# Patient Record
Sex: MALE | Race: WHITE | NOT HISPANIC OR LATINO | Employment: OTHER | URBAN - METROPOLITAN AREA
[De-identification: names, ages, dates, MRNs, and addresses within clinical notes are randomized per-mention and may not be internally consistent; named-entity substitution may affect disease eponyms.]

---

## 2021-02-25 ENCOUNTER — TRANSFERRED RECORDS (OUTPATIENT)
Dept: HEALTH INFORMATION MANAGEMENT | Facility: CLINIC | Age: 81
End: 2021-02-25

## 2021-04-08 ENCOUNTER — TRANSFERRED RECORDS (OUTPATIENT)
Dept: HEALTH INFORMATION MANAGEMENT | Facility: CLINIC | Age: 81
End: 2021-04-08

## 2021-06-08 ENCOUNTER — TRANSFERRED RECORDS (OUTPATIENT)
Dept: HEALTH INFORMATION MANAGEMENT | Facility: CLINIC | Age: 81
End: 2021-06-08

## 2021-06-10 ENCOUNTER — MEDICAL CORRESPONDENCE (OUTPATIENT)
Dept: HEALTH INFORMATION MANAGEMENT | Facility: CLINIC | Age: 81
End: 2021-06-10

## 2021-06-10 ENCOUNTER — TRANSFERRED RECORDS (OUTPATIENT)
Dept: HEALTH INFORMATION MANAGEMENT | Facility: CLINIC | Age: 81
End: 2021-06-10

## 2021-06-15 ENCOUNTER — MEDICAL CORRESPONDENCE (OUTPATIENT)
Dept: HEALTH INFORMATION MANAGEMENT | Facility: CLINIC | Age: 81
End: 2021-06-15

## 2021-06-15 ENCOUNTER — TRANSCRIBE ORDERS (OUTPATIENT)
Dept: OTHER | Age: 81
End: 2021-06-15

## 2021-06-15 DIAGNOSIS — C7A.090 PRIMARY ATYPICAL CARCINOID TUMOR OF LEFT LUNG (H): Primary | ICD-10-CM

## 2021-06-16 ENCOUNTER — MEDICAL CORRESPONDENCE (OUTPATIENT)
Dept: HEALTH INFORMATION MANAGEMENT | Facility: CLINIC | Age: 81
End: 2021-06-16

## 2021-06-17 NOTE — TELEPHONE ENCOUNTER
RECORDS STATUS - ALL OTHER DIAGNOSIS      RECORDS RECEIVED FROM: [facility]   DATE RECEIVED: 6/17/21   NOTES STATUS DETAILS   OFFICE NOTE from referring provider Received & Sent to [facility] 6/17 Dr. Shoaib Martin: 6/12/21 - 12/28/20   OFFICE NOTE from medical oncologist     DISCHARGE SUMMARY from hospital     DISCHARGE REPORT from the ER     PFT Received & Sent to [facility] 6/17 6/8/21   OPERATIVE REPORT     MEDICATION LIST     CLINICAL TRIAL TREATMENTS TO DATE     LABS     PATHOLOGY REPORTS     ANYTHING RELATED TO DIAGNOSIS Received & Sent to [facility] 6/17    GENONOMIC TESTING     TYPE:     IMAGING (NEED IMAGES & REPORT)     CT SCANS PACS 2/25/21, 9/28/20: [place]   MRI     MAMMO     ULTRASOUND     PET PACS 4/8/21, 8/17/20: [place]

## 2021-06-23 ENCOUNTER — PRE VISIT (OUTPATIENT)
Dept: SURGERY | Facility: CLINIC | Age: 81
End: 2021-06-23

## 2021-06-23 ENCOUNTER — VIRTUAL VISIT (OUTPATIENT)
Dept: SURGERY | Facility: CLINIC | Age: 81
End: 2021-06-23
Attending: THORACIC SURGERY (CARDIOTHORACIC VASCULAR SURGERY)
Payer: COMMERCIAL

## 2021-06-23 DIAGNOSIS — C7A.090 ATYPICAL CARCINOID LUNG TUMOR (H): Primary | ICD-10-CM

## 2021-06-23 PROCEDURE — 999N001193 HC VIDEO/TELEPHONE VISIT; NO CHARGE

## 2021-06-23 RX ORDER — MIRTAZAPINE 30 MG/1
30 TABLET, FILM COATED ORAL AT BEDTIME
COMMUNITY

## 2021-06-23 RX ORDER — VIT C/B6/B5/MAGNESIUM/HERB 173 50-5-6-5MG
CAPSULE ORAL
COMMUNITY

## 2021-06-23 RX ORDER — OXYBUTYNIN CHLORIDE 5 MG/1
5 TABLET ORAL
COMMUNITY
Start: 2020-07-13

## 2021-06-23 RX ORDER — IBUPROFEN 800 MG/1
800 TABLET, FILM COATED ORAL
COMMUNITY
Start: 2020-10-29

## 2021-06-23 RX ORDER — PREDNISONE 5 MG/1
5 TABLET ORAL DAILY
COMMUNITY

## 2021-06-23 RX ORDER — SIMVASTATIN 10 MG
10 TABLET ORAL
COMMUNITY
Start: 2020-10-29

## 2021-06-23 NOTE — LETTER
"    6/23/2021         RE: Armond Carcamo  [address] [address]        Dear Colleague,    Thank you for referring your patient, Armond Carcamo, to the [facility]. Please see a copy of my visit note below.    Armond is a 80 year old who is being evaluated via a billable video visit.      How would you like to obtain your AVS? Mail a copy  If the video visit is dropped, the invitation should be resent by: Send to e-mail at: [email or web address]  Will anyone else be joining your video visit? No       I have reviewed and updated the patient's allergies and medication list.    Concerns: none  Refills: none     Vitals - Patient Reported  Weight (Patient Reported): 79.4 kg (175 lb)  Height (Patient Reported): 185.4 cm (6' 1\")  BMI (Based on Pt Reported Ht/Wt): 23.09  Pain Score: No Pain (0)    Sahara Burns [facility]        THORACIC SURGERY - NEW PATIENT OFFICE VISIT      Dear Dr. Martin,    I saw Dona at Dr. Martin s request in consultation for the evaluation and treatment of a primary atypical carcinoid of left lung.     HPI  Mr. 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 He initially was hoarse. He underwent treatment with etoposide and cisplatinum and is now on immunotherapy. His hoarseness improved.                                     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  I appreciate the opportunity to participate in the care of your patient and will keep you updated.  Sincerely,        Again, thank you for allowing me to participate in the care of your patient.        Sincerely,        Juanpablo Mandujano MD

## 2021-06-23 NOTE — PROGRESS NOTES
"Armond is a 80 year old who is being evaluated via a billable video visit.      How would you like to obtain your AVS? Mail a copy  If the video visit is dropped, the invitation should be resent by: Send to e-mail at: [email or web address]  Will anyone else be joining your video visit? No       I have reviewed and updated the patient's allergies and medication list.    Concerns: none  Refills: none     Vitals - Patient Reported  Weight (Patient Reported): 79.4 kg (175 lb)  Height (Patient Reported): 185.4 cm (6' 1\")  BMI (Based on Pt Reported Ht/Wt): 23.09  Pain Score: No Pain (0)    Sahara Burns CMA        THORACIC SURGERY - NEW PATIENT OFFICE VISIT      Dear Dr. Martin,    I saw Barbaragatito at Dr. Martin s request in consultation for the evaluation and treatment of a primary atypical carcinoid of left lung.     HPI  Mr. 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 He initially was hoarse. He underwent treatment with etoposide and cisplatinum and is now on immunotherapy. His hoarseness improved.                                     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  I appreciate the opportunity to participate in the care of your patient and " will keep you updated.  Sincerely,

## 2021-07-09 ENCOUNTER — PREP FOR PROCEDURE (OUTPATIENT)
Dept: SURGERY | Facility: CLINIC | Age: 81
End: 2021-07-09

## 2021-07-13 ENCOUNTER — TELEPHONE (OUTPATIENT)
Dept: ONCOLOGY | Facility: CLINIC | Age: 81
End: 2021-07-13

## 2021-07-13 NOTE — TELEPHONE ENCOUNTER
----- Message from Zach Brown sent at 7/9/2021  3:07 PM CDT -----    ----- Message -----  From: Candy Lomeli APRN CNS  Sent: 7/9/2021   3:01 PM CDT  To: KARTIK Davis, #    Hey,    He needs a virtual visit with Dr. Mandujano in 3 weeks please.    Thanks  H

## 2021-07-26 ENCOUNTER — TRANSFERRED RECORDS (OUTPATIENT)
Dept: HEALTH INFORMATION MANAGEMENT | Facility: CLINIC | Age: 81
End: 2021-07-26

## 2021-08-04 ENCOUNTER — VIRTUAL VISIT (OUTPATIENT)
Dept: SURGERY | Facility: CLINIC | Age: 81
End: 2021-08-04
Attending: THORACIC SURGERY (CARDIOTHORACIC VASCULAR SURGERY)
Payer: COMMERCIAL

## 2021-08-04 DIAGNOSIS — C7A.090 ATYPICAL CARCINOID LUNG TUMOR (H): Primary | ICD-10-CM

## 2021-08-04 PROCEDURE — 99213 OFFICE O/P EST LOW 20 MIN: CPT | Mod: 95 | Performed by: STUDENT IN AN ORGANIZED HEALTH CARE EDUCATION/TRAINING PROGRAM

## 2021-08-04 NOTE — LETTER
"    8/4/2021         RE: Armond Carcamo  [address] [address]        Dear Colleague,    Thank you for referring your patient, Armond Carcamo, to the [facility]. Please see a copy of my visit note below.    Armond is a 81 year old who is being evaluated via a billable video visit.      How would you like to obtain your AVS? MyChart     If the video visit is dropped, the invitation should be resent by: Send to e-mail at: [email or web address]    Will anyone else be joining your video visit? No         Mohit Shultz LPN    Video Start Time: 5:02 PM  Video-Visit Details    Type of service:  Video Visit    Video End Time:5:02 PM    Originating Location (pt. Location): Home    Distant Location (provider location):  [facility]     Platform used for Video Visit: PT [employer]     TELEPHONE VISIT    THORACIC SURGERY - Follow-Up OFFICE VISIT      Dear Dr. Martin,    I saw Dona at Dr. Martin s request in consultation for the evaluation and treatment of a primary atypical carcinoid of left lung.     HPI  Mr. 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 He initially was hoarse. He underwent treatment with etoposide and cisplatinum and is now on immunotherapy. His hoarseness improved. He was seen in clinic last month to evaluate for possible surgery. Follow-up tests had been ordered and he is here to r/v the same.  However, in the last month, since his last dose of immunotherapy on 7/8/21, he has taken a big hit. He feels \"knocked out\" and is easily fatigued. He can barely do anything. He also lost 10lbs and lost his appetite. He feels like he hasn't bounced back like he ususally does. He says he is dreading tmrw's dose of immunotherapy.                                         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  On the CT scan, I do have some areas of concern regarding the ability to get an R0 resection safely. However, it can be considered. I will discuss his scan with our radiologists and Dr Mandujano.  The main issue though, is his general condition. With his current situation and the concern that he hasnt recovered well, I am uncertain that he would tolerate a pneumonectomy well.    I encouraged him to discuss concerns with the immunotherapy with his oncologist keysha.      All questions were answered and Armond Carcamo and present family were in agreement with the plan.  I appreciate the opportunity to participate in the care of your patient and will keep you updated.  Sincerely,          Again, thank you for allowing me to participate in the care of your patient.        Sincerely,        Kamille Ware MD

## 2021-08-04 NOTE — PROGRESS NOTES
"Armond is a 81 year old who is being evaluated via a billable video visit.      How would you like to obtain your AVS? MyChart     If the video visit is dropped, the invitation should be resent by: Send to e-mail at: [email or web address]    Will anyone else be joining your video visit? No         Mohit Shultz LPN    Video Start Time: 5:02 PM  Video-Visit Details    Type of service:  Video Visit    Video End Time:5:02 PM    Originating Location (pt. Location): Home    Distant Location (provider location):  [facility]     Platform used for Video Visit: [employer]     TELEPHONE VISIT    THORACIC SURGERY - Follow-Up OFFICE VISIT      Dear Dr. Martin,    I saw Dona at Dr. Martin s request in consultation for the evaluation and treatment of a primary atypical carcinoid of left lung.     HPI  Mr. 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 He initially was hoarse. He underwent treatment with etoposide and cisplatinum and is now on immunotherapy. His hoarseness improved. He was seen in clinic last month to evaluate for possible surgery. Follow-up tests had been ordered and he is here to r/v the same.  However, in the last month, since his last dose of immunotherapy on 7/8/21, he has taken a big hit. He feels \"knocked out\" and is easily fatigued. He can barely do anything. He also lost 10lbs and lost his appetite. He feels like he hasn't bounced back like he ususally does. He says he is dreading tmrw's dose of immunotherapy.                                         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  On the CT scan, I do have some areas of concern regarding the ability to get an R0 resection safely. However, it can be considered. I will discuss his scan with our radiologists and Dr Mandujano.  The main issue though, is his general condition. With his current situation and the concern that he hasnt recovered well, I am uncertain that he would tolerate a pneumonectomy well.    I encouraged him to discuss concerns with the immunotherapy with his oncologist keysha.      All questions were answered and Armond Carcamo and present family were in agreement with the plan.  I appreciate the opportunity to participate in the care of your patient and will keep you updated.  Sincerely,

## 2021-09-22 ENCOUNTER — VIRTUAL VISIT (OUTPATIENT)
Dept: SURGERY | Facility: CLINIC | Age: 81
End: 2021-09-22
Attending: THORACIC SURGERY (CARDIOTHORACIC VASCULAR SURGERY)
Payer: COMMERCIAL

## 2021-09-22 DIAGNOSIS — C34.02 MALIGNANT NEOPLASM OF HILUS OF LEFT LUNG (H): Primary | ICD-10-CM

## 2021-09-22 PROCEDURE — 99213 OFFICE O/P EST LOW 20 MIN: CPT | Mod: 95 | Performed by: THORACIC SURGERY (CARDIOTHORACIC VASCULAR SURGERY)

## 2021-09-22 PROCEDURE — 999N001193 HC VIDEO/TELEPHONE VISIT; NO CHARGE

## 2021-09-22 NOTE — PROGRESS NOTES
Phone-Visit Details    Type of service:  Phone Visit   13 minutes    THORACIC SURGERY - ESTABLISHED PATIENT OFFICE VISIT       Dear Dr. 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        HPI  Mr. 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 He initially was hoarse. He underwent treatment with etoposide and cisplatinum and is now on immunotherapy. His hoarseness improved.  Overall, he has improved since the last time we met. He can now slowly walk up to 3 miles, his voice is even better, and he has regained 8 lbs. Nonetheless, he is still very fatigued and he believes his energy levels are about 40% from baseline.                                     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 I reviewed the plan as follows:    1) CT with IV contrast: Scheduled for 10/2  2) Follow-up in 3 months: Mr. Carcamo is still very weak and needs more time to get stronger. I believe in 3 months I'll be able to make a definitive decision on whether surgery is a reasonable option for him. For the time being, I am not optimistic that surgery will be safe for him.     All questions were answered  and Armond Carcamo and present family were in agreement with the plan.  I appreciate the opportunity to participate in the care of your patient and will keep you updated.  Sincerely,

## 2021-09-22 NOTE — LETTER
9/22/2021         RE: Armond Carcamo  [address] [address]        Dear Colleague,    Thank you for referring your patient, Armond Carcamo, to the [facility]. Please see a copy of my visit note below.        Phone-Visit Details    Type of service:  Phone Visit   13 minutes    THORACIC SURGERY - ESTABLISHED PATIENT OFFICE VISIT       Dear Dr. 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        HPI  Mr. 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 He initially was hoarse. He underwent treatment with etoposide and cisplatinum and is now on immunotherapy. His hoarseness improved.  Overall, he has improved since the last time we met. He can now slowly walk up to 3 miles, his voice is even better, and he has regained 8 lbs. Nonetheless, he is still very fatigued and he believes his energy levels are about 40% from baseline.                                     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 I reviewed the plan as follows:    1) CT with IV contrast: Scheduled for 10/2  2) Follow-up in 3 months: Mr. Carcamo is still very weak  and needs more time to get stronger. I believe in 3 months I'll be able to make a definitive decision on whether surgery is a reasonable option for him. For the time being, I am not optimistic that surgery will be safe for him.     All questions were answered and Armond Carcamo and present family were in agreement with the plan.  I appreciate the opportunity to participate in the care of your patient and will keep you updated.  Sincerely,            Again, thank you for allowing me to participate in the care of your patient.        Sincerely,        Juanpablo Mandujano MD

## 2021-10-04 ENCOUNTER — TRANSFERRED RECORDS (OUTPATIENT)
Dept: HEALTH INFORMATION MANAGEMENT | Facility: CLINIC | Age: 81
End: 2021-10-04

## 2021-10-08 ENCOUNTER — PATIENT OUTREACH (OUTPATIENT)
Dept: SURGERY | Facility: CLINIC | Age: 81
End: 2021-10-08

## 2021-10-08 NOTE — TELEPHONE ENCOUNTER
Writer called patient to check on status of CT scan that was to be done the first week of October. Patient reported that he had Chest CT with IV Contrast completed on October 4th at [facility] in [place]. Writer reviewed Dr Mandujano's plan for follow up in 3 months.       Message sent to Records to obtain images.

## 2021-10-14 DIAGNOSIS — C34.02 MALIGNANT NEOPLASM OF HILUS OF LEFT LUNG (H): Primary | ICD-10-CM

## 2021-10-15 ENCOUNTER — HOSPITAL ENCOUNTER (INPATIENT)
Dept: GENERAL RADIOLOGY | Facility: CLINIC | Age: 81
End: 2021-10-15
Attending: CLINICAL NURSE SPECIALIST
Payer: COMMERCIAL

## 2021-10-15 DIAGNOSIS — C34.02 MALIGNANT NEOPLASM OF HILUS OF LEFT LUNG (H): ICD-10-CM

## 2021-10-15 DIAGNOSIS — C34.02 MALIGNANT NEOPLASM OF HILUS OF LEFT LUNG (H): Primary | ICD-10-CM

## 2021-10-15 PROCEDURE — 71260 CT THORAX DX C+: CPT | Mod: 26 | Performed by: RADIOLOGY

## 2021-12-15 ENCOUNTER — VIRTUAL VISIT (OUTPATIENT)
Dept: SURGERY | Facility: CLINIC | Age: 81
End: 2021-12-15
Attending: THORACIC SURGERY (CARDIOTHORACIC VASCULAR SURGERY)
Payer: COMMERCIAL

## 2021-12-15 DIAGNOSIS — C34.02 MALIGNANT NEOPLASM OF HILUS OF LEFT LUNG (H): Primary | ICD-10-CM

## 2021-12-15 PROCEDURE — 99214 OFFICE O/P EST MOD 30 MIN: CPT | Mod: 95 | Performed by: THORACIC SURGERY (CARDIOTHORACIC VASCULAR SURGERY)

## 2021-12-15 PROCEDURE — 999N001193 HC VIDEO/TELEPHONE VISIT; NO CHARGE

## 2021-12-15 NOTE — PROGRESS NOTES
Armond is a 81 year old who is being evaluated via a billable video visit.        Pt was put on a blood thinner ZARELTO 15 mg every 12 hours approx 3 weeks ago.     Pt is also taking Losartan 25 mg about 6 weeks ago.     Pt is taking 15 mg of Remeron at night. Please update.     Pt has been seen at [place] in [facility] for prescriptions.       How would you like to obtain your AVS? Mail a copy  If the video visit is dropped, the invitation should be resent by: Text to cell phone: [phone]  Will anyone else be joining your video visit? No pt wife may listen in.       Phone visit (issue with video): 12 min    THORACIC SURGERY - ESTABLISHED PATIENT OFFICE VISIT       Dear Dr. 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        HPI  Mr. 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 He initially was hoarse. He underwent treatment with etoposide and cisplatinum and XRT (started 2 years ago) and is currently on immunotherapy. His hoarseness improved.  He is maintaining his weight ~180 lbs.  Overall, he has improved even more. He can now slowly walk up to 3 miles, his voice is even better. Nonetheless, he is still very weak and he believes his energy levels are about 75% from baseline. He no longer needs naps every afternoon.                                     Previsit Tests   CT chest with IV contrast (10/4/2021): Significant response to therapy. DVT Jugular vein.    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 I reviewed the plan as follows:     1) Follow-up CT in early January 2022:  2) Tumor board discussion: He is being treated as per the PACIFIC trial principles, and I would like to discuss his case. I am of the opinion that given how long it took him to recover from chemoradiotherapy and that he has had an excellent response to IT so far, surgery is not reasonable and possibly not necessary.  3) Follow-up in early February 2022: I'll decide after tumor board if I should see him in person or not. I still think that he may be better off without surgery and just close follow-up with a good chance for cure.     months: Mr. Carcamo is still very weak and needs more time to get stronger. I believe in 3 months I'll be able to make a definitive decision on whether surgery is a reasonable option for him. For the time being, I am not optimistic that surgery will be safe for him.     All questions were answered and Armond Carcamo and present family were in agreement with the plan.  I appreciate the opportunity to participate in the care of your patient and will keep you updated.  Sincerely,

## 2021-12-15 NOTE — LETTER
12/15/2021         RE: Armond Carcamo  [address] [facility] [address]        Dear Colleague,    Thank you for referring your patient, Armond Carcamo, to the [facility]. Please see a copy of my visit note below.    Armond is a 81 year old who is being evaluated via a billable video visit.        Pt was put on a blood thinner ZARELTO 15 mg every 12 hours approx 3 weeks ago.     Pt is also taking Losartan 25 mg about 6 weeks ago.     Pt is taking 15 mg of Remeron at night. Please update.     Pt has been seen at [facility] in [facility] for prescriptions.       How would you like to obtain your AVS? Mail a copy  If the video visit is dropped, the invitation should be resent by: Text to cell phone: [phone]  Will anyone else be joining your video visit? No pt wife may listen in.       Phone visit (issue with video): 12 min    THORACIC SURGERY - ESTABLISHED PATIENT OFFICE VISIT       Dear Dr. 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        HPI  Mr. 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 He initially was hoarse. He underwent treatment with etoposide and cisplatinum and XRT (started 2 years ago) and is currently on immunotherapy. His hoarseness improved.  He is maintaining his weight ~180 lbs.  Overall, he has improved even more. He can now slowly walk up to 3 miles, his voice is even better. Nonetheless, he is still very weak and he believes his energy levels are about 75% from baseline. He no longer needs naps every afternoon.                                     Previsit Tests   CT chest with IV contrast (10/4/2021): Significant response to therapy. DVT Jugular vein.    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 I reviewed the plan as follows:     1) Follow-up CT in early January 2022:  2) Tumor board discussion: He is being treated as per the [place] trial principles, and I would like to discuss his case. I am of the opinion that given how long it took him to recover from chemoradiotherapy and that he has had an excellent response to IT so far, surgery is not reasonable and possibly not necessary.  3) Follow-up in early February 2022: I'll decide after tumor board if I should see him in person or not. I still think that he may be better off without surgery and just close follow-up with a good chance for cure.     months: Mr. Carcamo is still very weak and needs more time to get stronger. I believe in 3 months I'll be able to make a definitive decision on whether surgery is a reasonable option for him. For the time being, I am not optimistic that surgery will be safe for him.     All questions were answered and Armond Carcamo and present family were in agreement with the plan.  I appreciate the opportunity to participate in the care of your patient and will keep you updated.  Sincerely,          Again, thank you for allowing me to participate in the care of your patient.        Sincerely,        Juanpablo Mandujano MD

## 2021-12-20 DIAGNOSIS — C34.02 MALIGNANT NEOPLASM OF HILUS OF LEFT LUNG (H): Primary | ICD-10-CM

## 2021-12-23 ENCOUNTER — PATIENT OUTREACH (OUTPATIENT)
Dept: SURGERY | Facility: CLINIC | Age: 81
End: 2021-12-23
Payer: COMMERCIAL

## 2021-12-23 NOTE — TELEPHONE ENCOUNTER
"Writer attempted to call patient regarding the CT scan that Dr Mandujano would like him to have done in early January. Home number was \"no longer in service\".  No answer at mobile number. Left message with call back information.   "

## 2021-12-27 ENCOUNTER — PATIENT OUTREACH (OUTPATIENT)
Dept: ONCOLOGY | Facility: CLINIC | Age: 81
End: 2021-12-27
Payer: COMMERCIAL

## 2021-12-27 ENCOUNTER — PATIENT OUTREACH (OUTPATIENT)
Dept: SURGERY | Facility: CLINIC | Age: 81
End: 2021-12-27
Payer: COMMERCIAL

## 2021-12-27 NOTE — PROGRESS NOTES
Oncology Distress Screening Follow-up  Clinical Social Work  [facility]    Identified Concern and Score From Distress Screenin. How concerned are you about your ability to eat?  3       2. How concerned are you about unintended weight loss or your current weight?  1       3. How concerned are you about feeling depressed or very sad?  3       4. How concerned are you about feeling anxious or very scared?  1       5. Do you struggle with the loss of meaning and vilma in your life?  Somewhat       6. How concerned are you about work and home life issues that may be affected by your cancer?  4       7. How concerned are you about knowing what resources are available to help you?  6 Abnormal        8. Do you currently have what you would describe as Buddhist or spiritual struggles?             --  Unsure of question.         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 Mandujano at [facility]. At time of last visit, Armond scored positive on oncology distress screen. This clinician called Armond today with intention of introducing psychosocial services and support and following up on elevated distress screen.     Armond reported that he has been coping with poor energy since completing treatment, which has somewhat improved. Armond reported that he has not noticed a change in his strength, which is of some concern to him. LORI discussed THRIVE lectures and Survival 2 Strength programming to support strength building in survivorship. Armond receptive to mailed resources.     Armond with some concerns about whether future visits with Dr. Mandujano are considered in-network with his health insurance, or if surgery is recommended in future if his insurance would cover. LORI encouraged Armond to contact his health insurance company and ask about having a case  manager through his health insurance plan who can help advocate and explore options for asking if visits with [facility] could be considered in-network as they are recommended by his loca oncologist in [place], [place]. Armond expressed understanding and appreciation of outreach.     Emotional support provided today, validation of expressed concerns.     Education Provided:   Onc SW contact information  THRIVE Series  Resources for adding exercise into survivorship plan  Survival 2 Strength  Onc SW contact information ([facility])    Follow-up Required:   SW will remain available as needed for ongoing psychosocial support. Armond knows to reach out in case of concern or need.       OSMAR Pierre, Northern Light Mercy HospitalSW  Phone: [phone]  [facility]: M, Thu  *every other Tue, 8am-4:30pm  [facility]: W, F, *every other Tue, 8am-4:30pm        Student

## 2021-12-27 NOTE — TELEPHONE ENCOUNTER
Writer called patient to inquire about the status of the CT scan that Dr Mandujano requested to be done in early January.  Patient reports that his oncologist, Dr Clark Rizo scheduled one for January 12th at his local clini, [facility] in [place], [place].

## 2022-01-12 ENCOUNTER — TRANSFERRED RECORDS (OUTPATIENT)
Dept: HEALTH INFORMATION MANAGEMENT | Facility: CLINIC | Age: 82
End: 2022-01-12
Payer: COMMERCIAL

## 2022-01-17 NOTE — CONFIDENTIAL NOTE
Thoracic Tumor Conference      Patient Name: Armond Carcamo    Reason for conference discussion (brief overview): A 81 year old man with a large-cell neuroendocrine carcinoma of the hilum fo the left lung. He initially was hoarse. He underwent treatment with etoposide and cisplatinum and XRT (started 2 years ago) and is currently on immunotherapy. His hoarseness improved.  He is maintaining his weight ~180 lbs.  Overall, he has improved even more. Nonetheless, he is still very weak and he believes his energy levels are about 75% from baseline. Last seen in clinic in December. Had repeat Chest CT on Jan 12.    Specific Question:  He is being treated as per the PACIFIC trial principles.  I am of the opinion that given how long it took him to recover from chemoradiotherapy and that he has had an excellent response to IT so far, surgery is not reasonable and possibly not necessary.  What does the team think?    Pertinent Histology:    large-cell neuroendocrine carcinoma of the hilum fo the left lung    Referring Physician: Juanpablo Mandujano MD      The patient's case was presented at the multidisciplinary conference for the above noted reason.  There was a consensus recommendation for the following actions:     The group agreed that surgery is not the best option for the patient and patient should continue to follow with his local oncologist.          Case Lead:  Ainsley Marie RN-CC    Interventional Radiology Staff Present: N/A    Patient will be scheduled for an appointment with Dr Mandujano to discuss team recommendations.

## 2022-01-18 ENCOUNTER — TEAM CONFERENCE (OUTPATIENT)
Dept: SURGERY | Facility: CLINIC | Age: 82
End: 2022-01-18
Payer: COMMERCIAL

## 2022-01-18 DIAGNOSIS — C34.02 MALIGNANT NEOPLASM OF HILUS OF LEFT LUNG (H): Primary | ICD-10-CM

## 2022-02-15 NOTE — PROGRESS NOTES
Armond is a 81 year old who is being evaluated via a billable telephone visit.      Armond Carcamo stated he is in the state of [place] for the visit today.    What phone number would you like to be contacted at? [phone]  How would you like to obtain your AVS? Mail a copy  Phone call duration: 8 minutes  Arlen Fernandez, Virtual Visit Facilitator        THORACIC SURGERY - ESTABLISHED PATIENT OFFICE VISIT       Dear Dr. 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        HPI  Mr. 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 He initially was hoarse. He underwent treatment with etoposide and cisplatinum and XRT (started 2 years ago) and is currently on immunotherapy. His hoarseness improved.  He is maintaining his weight ~180 lbs.    Overall, he has improved even more. He can now slowly walk up to 3 miles, his voice is even better. Nonetheless, he is still very weak and he believes his energy levels are about 75% from baseline. He no longer needs naps every afternoon.                                     Previsit Tests   CT chest with IV contrast (1/22/2022): Stable      CT chest with IV contrast (10/4/2021): Significant response to therapy. DVT Jugular vein.    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     IMPRESSION (C34.02) Malignant neoplasm of hilus of left lung (H)  (primary encounter diagnosis)     80 year old year-old male with a large-cell neuroendocrine carcinoma of the LEFT lung     PLAN     I spent 15 min on the date of the encounter in chart  review, patient visit, review of tests, documentation and/or discussion with other providers about the issues documented above. I reviewed the plan as follows:     No surgery since it would require a pneumonectomy and he is on the PACIFIC protocol with a high chance of cure. Additionally, surgery would not be a good option for him regardless.     All questions were answered and Armond Carcamo and present family were in agreement with the plan.  I appreciate the opportunity to participate in the care of your patient and will keep you updated.  Sincerely,

## 2022-02-16 ENCOUNTER — VIRTUAL VISIT (OUTPATIENT)
Dept: SURGERY | Facility: CLINIC | Age: 82
End: 2022-02-16
Attending: THORACIC SURGERY (CARDIOTHORACIC VASCULAR SURGERY)
Payer: COMMERCIAL

## 2022-02-16 ENCOUNTER — PATIENT OUTREACH (OUTPATIENT)
Dept: SURGERY | Facility: CLINIC | Age: 82
End: 2022-02-16

## 2022-02-16 DIAGNOSIS — C34.02 MALIGNANT NEOPLASM OF HILUS OF LEFT LUNG (H): Primary | ICD-10-CM

## 2022-02-16 DIAGNOSIS — C7A.090 ATYPICAL CARCINOID LUNG TUMOR (H): ICD-10-CM

## 2022-02-16 PROCEDURE — 99213 OFFICE O/P EST LOW 20 MIN: CPT | Mod: 95 | Performed by: THORACIC SURGERY (CARDIOTHORACIC VASCULAR SURGERY)

## 2022-02-16 NOTE — TELEPHONE ENCOUNTER
Writer called patient, at the request of Dr Mandujano, to inquire if it would be ok if Dr Mandujano called him for his telephone visit around 5:30pm. Patient verbalized that it would be ok and appreciated the call.

## 2022-02-16 NOTE — LETTER
2/16/2022         RE: Armond Carcamo  [address]  [facility] [address]      Armond is a 81 year old who is being evaluated via a billable telephone visit.      Armond Carcamo stated he is in the state of [place] for the visit today.    What phone number would you like to be contacted at? [phone]  How would you like to obtain your AVS? Mail a copy  Phone call duration: 8 minutes  Arlen Fernandez, Virtual Visit Facilitator        THORACIC SURGERY - ESTABLISHED PATIENT OFFICE VISIT       Dear Dr. 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        HPI  Mr. 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 He initially was hoarse. He underwent treatment with etoposide and cisplatinum and XRT (started 2 years ago) and is currently on immunotherapy. His hoarseness improved.  He is maintaining his weight ~180 lbs.    Overall, he has improved even more. He can now slowly walk up to 3 miles, his voice is even better. Nonetheless, he is still very weak and he believes his energy levels are about 75% from baseline. He no longer needs naps every afternoon.                                     Previsit Tests   CT chest with IV contrast (1/22/2022): Stable      CT chest with IV contrast (10/4/2021): Significant response to therapy. DVT Jugular vein.    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     IMPRESSION (C34.02) Malignant neoplasm of hilus of left lung (H)  (primary encounter diagnosis)     80 year old year-old male with a large-cell neuroendocrine  carcinoma of the LEFT lung     PLAN     I spent 15 min on the date of the encounter in chart review, patient visit, review of tests, documentation and/or discussion with other providers about the issues documented above. I reviewed the plan as follows:     No surgery since it would require a pneumonectomy and he is on the PACIFIC protocol with a high chance of cure. Additionally, surgery would not be a good option for him regardless.     All questions were answered and Armond Carcamo and present family were in agreement with the plan.  I appreciate the opportunity to participate in the care of your patient and will keep you updated.  Sincerely,               Juanpablo Mandujano MD

## 2022-02-16 NOTE — Clinical Note
2/16/2022         RE: Armond Carcamo  [address] [facility] [address]        Dear Colleague,    Thank you for referring your patient, Armond Carcamo, to the [facility]. Please see a copy of my visit note below.    No notes on file    Again, thank you for allowing me to participate in the care of your patient.        Sincerely,        Juanpablo Mandujano MD